# Patient Record
Sex: MALE | Race: WHITE
[De-identification: names, ages, dates, MRNs, and addresses within clinical notes are randomized per-mention and may not be internally consistent; named-entity substitution may affect disease eponyms.]

---

## 2019-03-23 ENCOUNTER — HOSPITAL ENCOUNTER (EMERGENCY)
Dept: HOSPITAL 61 - ER | Age: 24
Discharge: HOME | End: 2019-03-23
Payer: COMMERCIAL

## 2019-03-23 VITALS
DIASTOLIC BLOOD PRESSURE: 84 MMHG | DIASTOLIC BLOOD PRESSURE: 84 MMHG | SYSTOLIC BLOOD PRESSURE: 159 MMHG | SYSTOLIC BLOOD PRESSURE: 159 MMHG

## 2019-03-23 VITALS — BODY MASS INDEX: 37.19 KG/M2 | WEIGHT: 315 LBS | HEIGHT: 77 IN

## 2019-03-23 DIAGNOSIS — R03.0: ICD-10-CM

## 2019-03-23 DIAGNOSIS — M25.572: Primary | ICD-10-CM

## 2019-03-23 PROCEDURE — 73610 X-RAY EXAM OF ANKLE: CPT

## 2019-03-23 PROCEDURE — 96372 THER/PROPH/DIAG INJ SC/IM: CPT

## 2019-03-23 PROCEDURE — 93971 EXTREMITY STUDY: CPT

## 2019-03-23 PROCEDURE — 99284 EMERGENCY DEPT VISIT MOD MDM: CPT

## 2019-03-23 NOTE — RAD
INDICATION: Left ankle swelling

 

COMPARISON: None.

 

TECHNIQUE: Grayscale, color and doppler ultrasound images were obtained of

the left lower extremity venous vasculature.

 

LEFT:

 

No thrombus identified in the common femoral vein, femoral vein, popliteal

vein or visualized calf veins.

 

IMPRESSION:

 

1. No thrombus identified in deep venous system of the left lower 

extremity.

 

Electronically signed by: Rigo Lake MD (3/23/2019 4:42 AM) [facility]

## 2019-03-23 NOTE — PHYS DOC
Past Medical History


Past Medical History:  No Pertinent History


Past Surgical History:  No Surgical History


Alcohol Use:  Occasionally


Drug Use:  None





Adult General


Chief Complaint


Chief Complaint:  LOWER EXT PAIN





HPI


HPI





Patient is a 23  year old male who presents with ankle pain. No definite trauma 

he woke up with this he got worse no fever he does not recall a previous 

history of this. He has not tried anything for relief. He did take a blister on 

his heel but otherwise does not identify any trauma.





Review of Systems


Review of Systems





Constitutional: Denies fever or chills []


Eyes: Denies change in visual acuity, redness, or eye pain []


HENT: Denies nasal congestion or sore throat []


Respiratory: Denies cough or shortness of breath []


Cardiovascular: No additional information not addressed in HPI []


GI: Denies abdominal pain, nausea, vomiting, bloody stools or diarrhea []


: Denies dysuria or hematuria []


Musculoskeletal: Denies back pain or joint pain []


Integument: Denies rash or skin lesions []


Neurologic: Denies headache, focal weakness or sensory changes []


Endocrine: Denies polyuria or polydipsia []





All other systems were reviewed and found to be within normal limits, except as 

documented in this note.





Current Medications


Current Medications





Current Medications








 Medications


  (Trade)  Dose


 Ordered  Sig/[facility]  Start Time


 Stop Time Status Last Admin


Dose Admin


 


 Ketorolac


 Tromethamine


  (Toradol 30mg


 Vial)  30 mg  1X  ONCE  3/23/19 04:00


 3/23/19 04:01 DC 3/23/19 04:12


30 MG











Allergies


Allergies





Allergies








Coded Allergies Type Severity Reaction Last Updated Verified


 


  No Known Drug Allergies    3/23/19 No











Physical Exam


Physical Exam





Constitutional: Well developed, well nourished, no acute distress, non-toxic 

appearance. []


HENT: Normocephalic, atraumatic, bilateral external ears normal, oropharynx 

moist, no oral exudates, nose normal. []


Eyes: PERRLA, EOMI, conjunctiva normal, no discharge. [] 


Neck: Normal range of motion, no tenderness, supple, no stridor. [] 


Cardiovascular:Heart rate regular rhythm, no murmur []


Lungs & Thorax:  Bilateral breath sounds clear to auscultation []


Abdomen: Bowel sounds normal, soft, no tenderness, no masses, no pulsatile 

masses. [] 


Skin: Warm, dry, no erythema, no rash. [] 





ext: There is moderate tenderness of the left ankle possible effusion noted no 

erythema no warmth pulses present pain is present with active range of motion 

and some mild pain with passive range of motion as well no calf tenderness 

small healing blister on the heel





Current Patient Data


Vital Signs





 Vital Signs








  Date Time  Temp Pulse Resp B/P (MAP) Pulse Ox O2 Delivery O2 Flow Rate FiO2


 


3/23/19 03:50 98.3 102 20 159/84 (109) 99 Room Air  





 98.3       











EKG


EKG


[]





Radiology/Procedures


Radiology/Procedures


[]


Impressions:


Ultrasound was negative for DVT.


Prelim read of the x-ray showed no obvious fracture. final read pending.





Course & Med Decision Making


Course & Med Decision Making


Pertinent Labs and Imaging studies reviewed. (See chart for details)





23-year-old male with ankle pain. He does appear to have some isolated mono 

arthritis of unclear etiology no fever no redness he would have no specific 

reason to have a septic joint. I did talk with him and his family about the 

option of doing a diagnostic arthrocentesis versus a trial of anti-inflammatory 

medication rest and see how he does there. He prefers the latter he knows 

strict return precautions to come back for fever or worsening symptoms.


Final read of the x-rays pending I dont  see any obvious fracture.





Dragon Disclaimer


Dragon Disclaimer


This electronic medical record was generated, in whole or in part, using a 

voice recognition dictation system.





Departure


Departure


Impression:  


 Primary Impression:  


 Elevated blood pressure reading


 Additional Impression:  


 Ankle pain


Disposition:  01 HOME, SELF-CARE


Condition:  STABLE


Referrals:  


AMADA KUO MD (PCP)


Patient Instructions:  Ankle Pain


Scripts


Hydrocodone/Apap 5-325 (NORCO 5-325 TABLET) 1 Each Tablet


1-2 EACH PO PRN Q6HRS PRN for PAIN, #15


   as needed for pain


   Prov: DEANDRE WILLS MD         3/23/19 


Indomethacin (INDOMETHACIN) 50 Mg Capsule


1 CAP PO BID, #14 CAP 1 Refill


   Prov: DEANDRE WILLS MD         3/23/19





Problem Qualifiers











DEANDRE WILLS MD Mar 23, 2019 05:54

## 2019-03-23 NOTE — RAD
ANKLE LEFT 3V

 

History: pain, no trauma.

 

Comparison: None.

 

Findings:

3 views of the left ankle are submitted. No acute fracture or dislocation 

is identified. There is a focus of sclerosis with likely some subtle 

internal lucency of the lateral aspect of the distal tibia extending to 

the cortex.

 

Impression: 

 

1.  There is a focus of sclerosis and central lucency of the lateral 

aspect of the distal tibia. 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 

nonossifying fibroma. Given history of pain without any history of trauma,

nonemergent MRI or bone scan may be beneficial.

 

Electronically signed by: Alejandro Calderon MD (3/23/2019 7:43 AM) [facility]

## 2019-03-25 ENCOUNTER — HOSPITAL ENCOUNTER (OUTPATIENT)
Dept: HOSPITAL 61 - MRI | Age: 24
Discharge: HOME | End: 2019-03-25
Attending: ORTHOPAEDIC SURGERY
Payer: COMMERCIAL

## 2019-03-25 DIAGNOSIS — M25.472: Primary | ICD-10-CM

## 2019-03-25 PROCEDURE — 73721 MRI JNT OF LWR EXTRE W/O DYE: CPT

## 2019-03-25 NOTE — RAD
MR of the left ankle

 

HISTORY: Lateral ankle pain for 2 days. No known injury.

 

TECHNIQUE: Routine multiplanar sequences are obtained.

 

FINDINGS:

Peroneal tendons are intact. Anterior talofibular ligament is thick and 

heterogeneous, compatible with sprain/scarring. No disruption or 

displacement. Posterior talofibular and calcaneofibular ligaments are 

intact. Inferior tibiofibular ligaments are intact.

 

Posterior tibial and flexor tendons are intact. No acute medial ligament 

injury. Anterior tibial and extensor tendons are intact. Achilles tendon 

intact. No acute plantar fasciitis.

 

Subtalar joints are patent. Tarsal sinus is intact. Talar dome is intact.

 

Small tibiotalar, subtalar and talonavicular joint effusions. No 

aggressive bone destruction. No acute fracture.

 

There is a partially visualized cortical based bone lesion at the 

posterior lateral tibial metaphysis, compatible with a fibrous cortical 

defect.

 

There is mild soft tissue edema around the ankle greatest medially.

 

IMPRESSION:

1. Anterior talofibular ligament sprain/scarring.

2. Small joint effusions about the ankle and hindfoot.

3. Bone lesion of the distal lateral tibia, compatible with a nonossifying

fibroma/fibrous cortical defect.

 

Electronically signed by: Oral Carmichael MD (3/25/2019 3:59 PM) [facility]

## 2022-04-24 ENCOUNTER — HOSPITAL ENCOUNTER (EMERGENCY)
Dept: HOSPITAL 61 - ER | Age: 27
Discharge: HOME | End: 2022-04-24
Payer: COMMERCIAL

## 2022-04-24 VITALS — BODY MASS INDEX: 37.19 KG/M2 | WEIGHT: 315 LBS | HEIGHT: 77 IN

## 2022-04-24 VITALS — DIASTOLIC BLOOD PRESSURE: 90 MMHG | SYSTOLIC BLOOD PRESSURE: 186 MMHG

## 2022-04-24 DIAGNOSIS — M79.672: ICD-10-CM

## 2022-04-24 DIAGNOSIS — R79.89: ICD-10-CM

## 2022-04-24 DIAGNOSIS — M25.572: Primary | ICD-10-CM

## 2022-04-24 LAB
ALBUMIN SERPL-MCNC: 4.1 G/DL (ref 3.4–5)
ALBUMIN/GLOB SERPL: 0.8 {RATIO} (ref 1–1.7)
ALP SERPL-CCNC: 69 U/L (ref 46–116)
ALT SERPL-CCNC: 54 U/L (ref 16–63)
ANION GAP SERPL CALC-SCNC: 8 MMOL/L (ref 6–14)
AST SERPL-CCNC: 23 U/L (ref 15–37)
BASOPHILS # BLD AUTO: 0.1 X10^3/UL (ref 0–0.2)
BASOPHILS NFR BLD: 1 % (ref 0–3)
BILIRUB SERPL-MCNC: 0.7 MG/DL (ref 0.2–1)
BUN SERPL-MCNC: 10 MG/DL (ref 8–26)
BUN/CREAT SERPL: 9 (ref 6–20)
CALCIUM SERPL-MCNC: 9.5 MG/DL (ref 8.5–10.1)
CHLORIDE SERPL-SCNC: 105 MMOL/L (ref 98–107)
CO2 SERPL-SCNC: 27 MMOL/L (ref 21–32)
CREAT SERPL-MCNC: 1.1 MG/DL (ref 0.7–1.3)
EOSINOPHIL NFR BLD: 0.3 X10^3/UL (ref 0–0.7)
EOSINOPHIL NFR BLD: 2 % (ref 0–3)
ERYTHROCYTE [DISTWIDTH] IN BLOOD BY AUTOMATED COUNT: 12.5 % (ref 11.5–14.5)
GFR SERPLBLD BASED ON 1.73 SQ M-ARVRAT: 80.3 ML/MIN
GLUCOSE SERPL-MCNC: 85 MG/DL (ref 70–99)
HCT VFR BLD CALC: 44.2 % (ref 39–53)
HGB BLD-MCNC: 15.3 G/DL (ref 13–17.5)
LYMPHOCYTES # BLD: 2.4 X10^3/UL (ref 1–4.8)
LYMPHOCYTES NFR BLD AUTO: 20 % (ref 24–48)
MCH RBC QN AUTO: 31 PG (ref 25–35)
MCHC RBC AUTO-ENTMCNC: 35 G/DL (ref 31–37)
MCV RBC AUTO: 89 FL (ref 79–100)
MONO #: 0.9 X10^3/UL (ref 0–1.1)
MONOCYTES NFR BLD: 8 % (ref 0–9)
NEUT #: 8.6 X10^3/UL (ref 1.8–7.7)
NEUTROPHILS NFR BLD AUTO: 70 % (ref 31–73)
PLATELET # BLD AUTO: 218 X10^3/UL (ref 140–400)
POTASSIUM SERPL-SCNC: 3.7 MMOL/L (ref 3.5–5.1)
PROT SERPL-MCNC: 9 G/DL (ref 6.4–8.2)
RBC # BLD AUTO: 4.95 X10^6/UL (ref 4.3–5.7)
SODIUM SERPL-SCNC: 140 MMOL/L (ref 136–145)
URATE SERPL-MCNC: 9.4 MG/DL (ref 3.5–7.2)
WBC # BLD AUTO: 12.2 X10^3/UL (ref 4–11)

## 2022-04-24 PROCEDURE — 86140 C-REACTIVE PROTEIN: CPT

## 2022-04-24 PROCEDURE — 85651 RBC SED RATE NONAUTOMATED: CPT

## 2022-04-24 PROCEDURE — 84550 ASSAY OF BLOOD/URIC ACID: CPT

## 2022-04-24 PROCEDURE — 99284 EMERGENCY DEPT VISIT MOD MDM: CPT

## 2022-04-24 PROCEDURE — 36415 COLL VENOUS BLD VENIPUNCTURE: CPT

## 2022-04-24 PROCEDURE — 80053 COMPREHEN METABOLIC PANEL: CPT

## 2022-04-24 PROCEDURE — 85025 COMPLETE CBC W/AUTO DIFF WBC: CPT

## 2022-04-24 PROCEDURE — 73610 X-RAY EXAM OF ANKLE: CPT

## 2022-04-24 PROCEDURE — 73630 X-RAY EXAM OF FOOT: CPT

## 2022-04-24 NOTE — RAD
Exam Date:  4/24/2022 6:19 PM



XR EXAM OF ANKLE_LEFT 3V, XR FOOT_LEFT 3 VIEWS



Indication: Pain.  Reason: pain and swelling / Spl. Instructions:  / History: .



COMPARISON: March 23, 2019



FINDINGS/

IMPRESSION:  



Ankle mortise is intact.  Cortical sclerosis along the distal tibial metaphysis laterally is consiste
nt with nonossifying fibroma, similar compared to the prior exam.  There is soft tissue swelling arou
nd the ankle.  No acute fracture or dislocation.  Alignment is maintained.  Mild degenerative changes
 are noted.



Electronically signed by: Aki Lawrence MD (4/24/2022 6:53 PM) [id number]

## 2022-04-24 NOTE — PHYS DOC
Past Medical History


Past Medical History:  No Pertinent History


Past Surgical History:  No Surgical History


Smoking Status:  Never Smoker


Alcohol Use:  Occasionally


Drug Use:  None





General Adult


EDM:


Chief Complaint:  FOOT INJURY PAIN





HPI:


HPI:





Patient is a 26  year old male who presents with 2 days of left foot and ankle 

pain and swelling.  He states it only hurts when he goes to try to stand or move

at the joint.  He states only thing he did 2 days ago was mow the lawn.  Does 

not remember injuring the extremity.  Denies numbness and tingling or focal 

weakness.  He does have crutches.  He can put some weight on the extremity is 

just painful.  He states a couple years ago this happened and it got better on 

its own with rest.





Review of Systems:


Review of Systems:


Constitutional:   Denies fever or chills. []


Eyes:   Denies change in visual acuity. []


HENT:   Denies nasal congestion or sore throat. [] 


Respiratory:   Denies cough or shortness of breath. [] 


Cardiovascular:   Denies chest pain or +Left foot edema. +Left ankle [] 


GI:   Denies abdominal pain, nausea, vomiting, bloody stools or diarrhea. [] 


:  Denies dysuria. [] 


Musculoskeletal:   Denies back pain or +Left foot joint pain. +Left ankle[] 


Integument:   Denies rash. [] 


Neurologic:   Denies headache, focal weakness or sensory changes. [] 


Endocrine:   Denies polyuria or polydipsia. [] 


Lymphatic:  Denies swollen glands. [] 


Psychiatric:  Denies depression or anxiety. []





Heart Score:


C/O Chest Pain:  No





Current Medications:





Current Medications








 Medications


  (Trade)  Dose


 Ordered  Sig/[facility]  Start Time


 Stop Time Status Last Admin


Dose Admin


 


 Ibuprofen


  (Motrin)  800 mg  1X  ONCE  22 18:15


 22 18:16 UNV  














Allergies:


Allergies:





Allergies








Coded Allergies Type Severity Reaction Last Updated Verified


 


  No Known Drug Allergies    3/23/19 No











Physical Exam:


PE:





Constitutional: Well developed, well nourished, no acute distress, non-toxic 

appearance. []


HENT: Normocephalic, atraumatic, bilateral external ears normal, oropharynx 

moist, no oral exudates, nose normal. []


Eyes: PERRLA, EOMI, conjunctiva normal, no discharge. [] 


Neck: Normal range of motion, no tenderness, supple, no stridor. [] 


Cardiovascular:Heart rate regular rhythm, no murmur []


Lungs & Thorax:  Bilateral breath sounds clear to auscultation []


Abdomen: Bowel sounds normal, soft, no tenderness, no masses, no pulsatile 

masses. [] 


Skin: Warm, dry, no erythema, no rash. [] 


Back: No tenderness, no CVA tenderness. [] 


Extremities: No tenderness, no cyanosis, no clubbing, ROM intact but limited due

 to pain, 2-3+ edema. [] 


Neurologic: Alert and oriented X 3, normal motor function, normal sensory 

function, no focal deficits noted. []


Psychologic: Affect normal, judgement normal, mood normal. []





Current Patient Data:


Vital Signs:





                                   Vital Signs








  Date Time  Temp Pulse Resp B/P (MAP) Pulse Ox O2 Delivery O2 Flow Rate FiO2


 


22 17:46 97.5 109 22 186/90 (122) 97 Room Air  





 97.5       











EKG:


EKG:


[]





Radiology/Procedures:


Radiology/Procedures:


[]


Impression:


                            [facility]


                    [address] [place] [address]


                                 [phone]


                                        


                                 IMAGING REPORT





                                     Signed





PATIENT: GANESH GERARD MACCOUNT: [id number]     MRN#: [id number]


: 1995           LOCATION: ER              AGE: 26


SEX: M                    EXAM DT: 22         ACCESSION#: [id number]


STATUS: PRE ER            ORD. PHYSICIAN: RALPH GONZALEZ


REASON: pain and swelling


PROCEDURE: ANKLE LEFT 3V





Exam Date:  2022 6:19 PM





XR EXAM OF ANKLE_LEFT 3V, XR FOOT_LEFT 3 VIEWS





Indication: Pain.  Reason: pain and swelling / Spl. Instructions:  / History: .





COMPARISON: 2019





FINDINGS/


IMPRESSION:  





Ankle mortise is intact.  Cortical sclerosis along the distal tibial metaphysis 

laterally is consistent with nonossifying fibroma, similar compared to the prior

 exam.  There is soft tissue swelling around the ankle.  No acute fracture or 

dislocation.  Alignment is maintained.  Mild degenerative changes are noted.





Electronically signed by: Víctor Lawrence MD (2022 6:53 PM) [id number]














DICTATED and SIGNED BY:     VÍCTOR LAWRENCE MD


DATE:     22





Course & Med Decision Making:


Course & Med Decision Making


Pertinent Labs and Imaging studies reviewed. (See chart for details)





See HPI.  Alert and oriented x4.  Ambulatory but limping on the left lower 

extremity.  Pedal pulse are present.  Cap refill less than 2 seconds.  Skin pink

 warm and dry.  No calf tenderness.  No recent injury, travel or procedures done

 to the extremity.  Can wiggle his toes.  Full strengths.  Neurologically 

intact.  There is no tenderness.  No redness or warmth to the joint. He is does 

state that it is better when he stands on it but puts more pressure on the ball 

of his foot.  He states he cannot put any pressure on the heel of his foot.  

States its a throbbing/sharp type pain.  No calf tenderness.  No swelling of 

past ankle.  No deformity.  No bruising, abrasion or laceration. Uric acid is 

9.4. 





Patient placed on steroid, anti inflammatory. Dr Clark states no Colchicine at 

this time.  Patient given Ace wrap.  She currently has crutches with him.





**2019 MRI**


IMPRESSION:


1. Anterior talofibular ligament sprain/scarring.


2. Small joint effusions about the ankle and hindfoot.


3. Bone lesion of the distal lateral tibia, compatible with a nonossifying


fibroma/fibrous cortical defect.


 





[]





Dragon Disclaimer:


Dragon Disclaimer:


This electronic medical record was generated, in whole or in part, using a voice

 recognition dictation system.





Departure


Departure


Impression:  


   Primary Impression:  


   Ankle pain, left


   Qualified Codes:  M25.572 - Pain in left ankle and joints of left foot


   Additional Impressions:  


   Foot pain, left


   Elevated uric acid in blood


Disposition:   HOME / SELF CARE / HOMELESS


Condition:  STABLE


Referrals:  


AMADA KUO MD (PCP)








JAYCE LERMA II, MD


Patient Instructions:  Gout, Uric Acid Test





Additional Instructions:  


Follow-up with a primary care provider or an orthopedic doctor.  Use ice and 

elevation.  Take medication as prescribed and with food.  If there is any 

redness, and tenderness and increased swelling to the joint and come back to the

 emergency room.


Scripts


Prednisone (PREDNISONE **) 10 Mg Tablet


3 TAB PO DAILY for 5 Days, #15 TAB 0 Refills


   Prov: CARLOSRALPHHUMZA TORRES         22 


Diclofenac Sodium (DICLOFENAC SODIUM) 50 Mg Tablet.dr


50 MG PO TID for 14 Days, #42 TAB


   Prov: RALPH GONZALEZ         22











RALPH GONZALEZ            2022 18:15

## 2022-04-24 NOTE — RAD
Exam Date:  4/24/2022 6:19 PM



XR EXAM OF ANKLE_LEFT 3V, XR FOOT_LEFT 3 VIEWS



Indication: Pain.  Reason: pain and swelling / Spl. Instructions:  / History: .



COMPARISON: March 23, 2019



FINDINGS/

IMPRESSION:  



Ankle mortise is intact.  Cortical sclerosis along the distal tibial metaphysis laterally is consiste
nt with nonossifying fibroma, similar compared to the prior exam.  There is soft tissue swelling arou
nd the ankle.  No acute fracture or dislocation.  Alignment is maintained.  Mild degenerative changes
 are noted.



Electronically signed by: Aki Lawrence MD (4/24/2022 6:53 PM) [id number]

## 2022-04-25 LAB — CRP SERPL-MCNC: 42.8 MG/L (ref 0–3.3)
